# Patient Record
Sex: MALE | Race: BLACK OR AFRICAN AMERICAN | NOT HISPANIC OR LATINO | URBAN - METROPOLITAN AREA
[De-identification: names, ages, dates, MRNs, and addresses within clinical notes are randomized per-mention and may not be internally consistent; named-entity substitution may affect disease eponyms.]

---

## 2018-11-14 ENCOUNTER — EMERGENCY (EMERGENCY)
Facility: HOSPITAL | Age: 53
LOS: 1 days | Discharge: HOME | End: 2018-11-14
Attending: EMERGENCY MEDICINE
Payer: SELF-PAY

## 2018-11-14 VITALS
OXYGEN SATURATION: 98 % | RESPIRATION RATE: 18 BRPM | DIASTOLIC BLOOD PRESSURE: 80 MMHG | HEART RATE: 90 BPM | SYSTOLIC BLOOD PRESSURE: 125 MMHG | TEMPERATURE: 98 F

## 2018-11-14 VITALS
HEART RATE: 96 BPM | DIASTOLIC BLOOD PRESSURE: 93 MMHG | TEMPERATURE: 97 F | SYSTOLIC BLOOD PRESSURE: 153 MMHG | OXYGEN SATURATION: 95 % | RESPIRATION RATE: 18 BRPM

## 2018-11-14 PROCEDURE — 99283 EMERGENCY DEPT VISIT LOW MDM: CPT | Mod: 25

## 2018-11-14 PROCEDURE — 96372 THER/PROPH/DIAG INJ SC/IM: CPT

## 2018-11-14 PROCEDURE — 99053 MED SERV 10PM-8AM 24 HR FAC: CPT

## 2018-11-14 RX ORDER — KETOROLAC TROMETHAMINE 30 MG/ML
60 SYRINGE (ML) INJECTION ONCE
Qty: 0 | Refills: 0 | Status: DISCONTINUED | OUTPATIENT
Start: 2018-11-14 | End: 2018-11-14

## 2018-11-14 RX ORDER — IBUPROFEN 200 MG
1 TABLET ORAL
Qty: 40 | Refills: 0 | OUTPATIENT
Start: 2018-11-14 | End: 2018-11-23

## 2018-11-14 RX ADMIN — Medication 60 MILLIGRAM(S): at 03:28

## 2018-11-14 NOTE — ED PROVIDER NOTE - MEDICAL DECISION MAKING DETAILS
PT with stable VS, no acute distress, non toxic appearing, tolerating PO in the ED, PT with likely gout flair atraumatic, no s/s of acute infection,  will dc home with steroids with follow up to rheumatology, educated about when to return to the ED if needed. PT verbalizes that he understands all instructions and results. Pt educated about the risks vs benefits of imaging at this time and agrees that not warranted for their symptoms, and PE

## 2018-11-14 NOTE — ED PROVIDER NOTE - OBJECTIVE STATEMENT
PT with SPMHX of gout presents to the ED with complaint of Rt hand pain x 1wk. PT states that he is a  and when he does on long trips he has flair ups of his gout pt states that he recently had a trip ate a increased amount of red meat and now feels like he is having an gouty flair. PT states that he tx with ibu at home wo improvement, pt states that in the past only medication to help is prednisone. pt admits to swelling and pain, denies fever, chills, trama, loss of sensation, decreased ROM, loss of strength.

## 2018-11-14 NOTE — ED ADULT TRIAGE NOTE - CHIEF COMPLAINT QUOTE
"My right hand and arm is swollen and in pain" "Im a  and when this happens they give me a shot and prednisone" c/o right hand swelling and pain x1 week. Pt reports pain has improved with ibuprofen but is no longer improving. Denies injury denies fevers. Able to ambulate with steady gait noted, appears in no apparent distress @ this time

## 2018-11-14 NOTE — ED PROVIDER NOTE - ATTENDING CONTRIBUTION TO CARE
I personally saw the patient with the PA, and completed the key components of the history and physical exam. I then discussed the management plan with the PA.   gen in nad rersp clear cardiac no murmur msk ttp right wrist no sign septic joint neurovasc intact   agree with pa plan of care

## 2022-03-20 NOTE — ED PROVIDER NOTE - CPE EDP RESP NORM
Ongoing SW/CM Assessment/Plan of Care Note     See SW/CM flowsheets for goals and other objective data.    Patient/Family discharge goal (s):  Goal #1: Psychosocial needs assessed          PT Recommendation:     Recommendation for Discharge: PT IL: Patient requires 24 HOUR assistance to perform mobility and/or ADLs safely, Patient is appropriate for Physical Therapy 1-3 times per week (bed/chair level at the time of this evaluation)    OT Recommendation:     Recommendations for Discharge: OT IL: Patient requires 24 HOUR assistance to perform mobility and/or ADLs safely, Patient is appropriate for Occupational Therapy 1-3 times per week    SLP Recommendation:  Recommendations for Discharge: SLP: Ongoing  next level of care           Progress note:  Late entry 3/19/2022   Multiple phone conversations between representatives from [facility] and [facility]. Sent additional information through [employer] as requested. Final outcome at 1638  From representative Dianelys, associated with [facility], was that insurance verification will take place on Monday. No final confirmation received from [facility].           normal...

## 2022-12-01 NOTE — ED PROVIDER NOTE - NS ED MD DISPO DISCHARGE
[facility] - Surgery (Hospital)  Brief Operative Note    Surgery Date: 12/1/2022     Surgeon(s) and Role:     * Teodoro Marcus MD - Primary    Assisting Surgeon: MAGALYS Burnett    Pre-op Diagnosis:  Complete tear of anterior cruciate ligament of right knee, initial encounter [S83.511A]    Post-op Diagnosis:  Post-Op Diagnosis Codes:     * Complete tear of anterior cruciate ligament of right knee, initial encounter [S83.511A]    Procedure(s) (LRB):  RECONSTRUCTION, KNEE, ACL, ARTHROSCOPIC WITH BTB AUTOGRAFT (Right)  CHONDROPLASTY (Right)    Anesthesia: General/Regional    Operative Findings:  see op note    Estimated Blood Loss: Minimal          Specimens:   Specimen (24h ago, onward)      None              Discharge Note    OUTCOME: Patient tolerated treatment/procedure well without complication and is now ready for discharge.    DISPOSITION: Home or Self Care    FINAL DIAGNOSIS:  <principal problem not specified>    FOLLOWUP: In clinic    DISCHARGE INSTRUCTIONS:    Discharge Procedure Orders   Ambulatory referral/consult to Physical/Occupational Therapy   Standing Status: Future   Referral Priority: Emergency Referral Type: Physical Medicine   Referral Reason: Specialty Services Required   Number of Visits Requested: 1     Diet Adult Regular     Notify your health care provider if you experience any of the following:  persistent nausea and vomiting or diarrhea     Notify your health care provider if you experience any of the following:  severe uncontrolled pain     Activity as tolerated   Order Comments: Hinge brace locked in ext wbat     I have reviewed the notes, assessments, and/or procedures performed by Dr. Diamond, I concur with her/his documentation of Thomas Taveras.     Home